# Patient Record
Sex: FEMALE | Race: BLACK OR AFRICAN AMERICAN | NOT HISPANIC OR LATINO
[De-identification: names, ages, dates, MRNs, and addresses within clinical notes are randomized per-mention and may not be internally consistent; named-entity substitution may affect disease eponyms.]

---

## 2019-05-20 ENCOUNTER — TRANSCRIPTION ENCOUNTER (OUTPATIENT)
Age: 44
End: 2019-05-20

## 2019-05-20 ENCOUNTER — EMERGENCY (EMERGENCY)
Facility: HOSPITAL | Age: 44
LOS: 1 days | Discharge: HOME | End: 2019-05-20
Attending: EMERGENCY MEDICINE | Admitting: EMERGENCY MEDICINE
Payer: COMMERCIAL

## 2019-05-20 ENCOUNTER — INPATIENT (INPATIENT)
Facility: HOSPITAL | Age: 44
LOS: 0 days | Discharge: HOME | DRG: 337 | End: 2019-05-21
Attending: SURGERY | Admitting: SURGERY
Payer: COMMERCIAL

## 2019-05-20 VITALS
RESPIRATION RATE: 16 BRPM | TEMPERATURE: 98 F | OXYGEN SATURATION: 100 % | SYSTOLIC BLOOD PRESSURE: 127 MMHG | HEART RATE: 88 BPM | DIASTOLIC BLOOD PRESSURE: 83 MMHG

## 2019-05-20 VITALS
DIASTOLIC BLOOD PRESSURE: 80 MMHG | RESPIRATION RATE: 18 BRPM | OXYGEN SATURATION: 98 % | HEART RATE: 100 BPM | TEMPERATURE: 97 F | SYSTOLIC BLOOD PRESSURE: 122 MMHG

## 2019-05-20 VITALS — WEIGHT: 175.05 LBS | HEIGHT: 59 IN

## 2019-05-20 DIAGNOSIS — E66.01 MORBID (SEVERE) OBESITY DUE TO EXCESS CALORIES: Chronic | ICD-10-CM

## 2019-05-20 DIAGNOSIS — K56.609 UNSPECIFIED INTESTINAL OBSTRUCTION, UNSPECIFIED AS TO PARTIAL VERSUS COMPLETE OBSTRUCTION: ICD-10-CM

## 2019-05-20 LAB
APPEARANCE UR: SIGNIFICANT CHANGE UP
BACTERIA # UR AUTO: NEGATIVE — SIGNIFICANT CHANGE UP
BILIRUB UR-MCNC: NEGATIVE — SIGNIFICANT CHANGE UP
BLOOD UR QL VISUAL: NEGATIVE — SIGNIFICANT CHANGE UP
COLOR SPEC: YELLOW — SIGNIFICANT CHANGE UP
GLUCOSE UR-MCNC: NEGATIVE — SIGNIFICANT CHANGE UP
HYALINE CASTS # UR AUTO: SIGNIFICANT CHANGE UP
KETONES UR-MCNC: HIGH
LEUKOCYTE ESTERASE UR-ACNC: SIGNIFICANT CHANGE UP
NITRITE UR-MCNC: NEGATIVE — SIGNIFICANT CHANGE UP
PH UR: 8 — SIGNIFICANT CHANGE UP (ref 5–8)
PROT UR-MCNC: 70 — SIGNIFICANT CHANGE UP
RBC CASTS # UR COMP ASSIST: SIGNIFICANT CHANGE UP (ref 0–?)
SP GR SPEC: 1.03 — SIGNIFICANT CHANGE UP (ref 1–1.04)
SQUAMOUS # UR AUTO: SIGNIFICANT CHANGE UP
UROBILINOGEN FLD QL: SIGNIFICANT CHANGE UP
WBC UR QL: HIGH (ref 0–?)

## 2019-05-20 PROCEDURE — 74177 CT ABD & PELVIS W/CONTRAST: CPT | Mod: 26

## 2019-05-20 PROCEDURE — 99284 EMERGENCY DEPT VISIT MOD MDM: CPT

## 2019-05-20 RX ORDER — SODIUM CHLORIDE 9 MG/ML
1000 INJECTION, SOLUTION INTRAVENOUS ONCE
Refills: 0 | Status: COMPLETED | OUTPATIENT
Start: 2019-05-20 | End: 2019-05-20

## 2019-05-20 RX ORDER — SODIUM CHLORIDE 9 MG/ML
1000 INJECTION, SOLUTION INTRAVENOUS
Refills: 0 | Status: DISCONTINUED | OUTPATIENT
Start: 2019-05-20 | End: 2019-05-21

## 2019-05-20 RX ORDER — SODIUM CHLORIDE 9 MG/ML
1000 INJECTION INTRAMUSCULAR; INTRAVENOUS; SUBCUTANEOUS ONCE
Refills: 0 | Status: COMPLETED | OUTPATIENT
Start: 2019-05-20 | End: 2019-05-20

## 2019-05-20 RX ORDER — MORPHINE SULFATE 50 MG/1
4 CAPSULE, EXTENDED RELEASE ORAL ONCE
Refills: 0 | Status: DISCONTINUED | OUTPATIENT
Start: 2019-05-20 | End: 2019-05-20

## 2019-05-20 RX ORDER — ONDANSETRON 8 MG/1
4 TABLET, FILM COATED ORAL ONCE
Refills: 0 | Status: COMPLETED | OUTPATIENT
Start: 2019-05-20 | End: 2019-05-20

## 2019-05-20 RX ADMIN — SODIUM CHLORIDE 1000 MILLILITER(S): 9 INJECTION, SOLUTION INTRAVENOUS at 21:27

## 2019-05-20 RX ADMIN — MORPHINE SULFATE 4 MILLIGRAM(S): 50 CAPSULE, EXTENDED RELEASE ORAL at 17:43

## 2019-05-20 RX ADMIN — MORPHINE SULFATE 4 MILLIGRAM(S): 50 CAPSULE, EXTENDED RELEASE ORAL at 22:35

## 2019-05-20 RX ADMIN — ONDANSETRON 4 MILLIGRAM(S): 8 TABLET, FILM COATED ORAL at 17:42

## 2019-05-20 RX ADMIN — SODIUM CHLORIDE 1000 MILLILITER(S): 9 INJECTION INTRAMUSCULAR; INTRAVENOUS; SUBCUTANEOUS at 21:27

## 2019-05-20 RX ADMIN — SODIUM CHLORIDE 2000 MILLILITER(S): 9 INJECTION INTRAMUSCULAR; INTRAVENOUS; SUBCUTANEOUS at 18:49

## 2019-05-20 RX ADMIN — MORPHINE SULFATE 4 MILLIGRAM(S): 50 CAPSULE, EXTENDED RELEASE ORAL at 21:32

## 2019-05-20 NOTE — CONSULT NOTE ADULT - ASSESSMENT
44F s/p zaria en y gastric bypass 1/2019 at [facility] presented today with two days of abdominal pain with CT findings concerning for internal hernia    -NPO  -please start banana bag STAT  -plan to transfer to [facility], b[facility] of excellence, for surgical intervention  under Dr. Adam Cruz, PGY-3

## 2019-05-20 NOTE — ED PROVIDER NOTE - OBJECTIVE STATEMENT
45 yo F h/o asthma, GERD, obesity, s/p gastric bypass (1/2019) at [facility], who reports diffuse abd pain, worse over the epigastrium since yesterday evening. Pain associated w/ nausea and a few episodes of non blood non bilious emesis which started after 11am shortly after drinking nutrient shake. Denies fevers or chills. Denies chest pain, back pain, flank pain, or urinary complaints.    Last BM - yesterday, non bloody; ; has not been passing gas;  Last PO - 11 am today (nutrient shake)

## 2019-05-20 NOTE — ED PROVIDER NOTE - NSRISKOFTRANSFER_ED_A_ED
Transportation Risk (There is always a risk of traffic delays resulting in deterioration of condition.)/Deterioration of Condition En Route/Death or Disability/Increased Pain

## 2019-05-20 NOTE — CONSULT NOTE ADULT - SUBJECTIVE AND OBJECTIVE BOX
GENERAL SURGERY CONSULT NOTE    44F s/p zaria en y gastric bypass 2019 at [facility] presented today with two days of abdominal pain. As per patient, pain developed yesterday but was not severe and was intermittent. Pain became severe today and was associated with several episodes of dark emesis. Pain worse in bilateral upper quadrants, nothing makes the pain better or worse. Patient does not wish to return to her surgeon at [facility], because she was frightened of a superficial infection she had had from her JOHANNY site.   In the ED, patient with normal vital signs. WBC 10.6, lactate 3.0. Significant tenderness on abdominal exam, sites well healed, CT scan showing concern for internal hernia causing SBO.   Patient with history of diabetes and gastric sleeve in the past with failure to lose goal weight.     PAST MEDICAL & SURGICAL HISTORY:  Mild intermittent asthma without complication: last rescue inhaler use 4 months ago  Hiatal hernia  Morbid obesity  Sleep apnea, obstructive: CPAP  - not using  GERD (gastroesophageal reflux disease)  S/P  section  Breast hypertrophy: s/p breast reduction  Obesity, morbid: s/p Lap band  2009    FAMILY HISTORY:  No pertinent family history in first degree relatives    SOCIAL HISTORY:    MEDICATIONS  (STANDING):  multivitamin/thiamine/folic acid in sodium chloride 0.9% 1000 milliLiter(s) (1000 mL/Hr) IV Continuous <Continuous>    MEDICATIONS  (PRN):    Allergies    doxycycline (Hives; Anaphylaxis)  food allergy (Unknown)    Intolerances    PHYSICAL EXAM    Vital Signs Last 24 Hrs  T(C): 36.8 (20 May 2019 21:32), Max: 37 (20 May 2019 17:06)  T(F): 98.2 (20 May 2019 21:32), Max: 98.6 (20 May 2019 17:06)  HR: 88 (20 May 2019 21:32) (70 - 100)  BP: 127/83 (20 May 2019 21:32) (115/57 - 127/83)  BP(mean): --  RR: 16 (20 May 2019 21:32) (16 - 18)  SpO2: 100% (20 May 2019 21:32) (98% - 100%)  Daily     Daily     General: WN/WD NAD  Neurology: A&Ox3, nonfocal, ALLEN x 4  Head:  Normocephalic, atraumatic  ENT:  Mucosa moist, no ulcerations  Neck:  Supple, no sinuses or palpable masses  Lymphatic:  No palpable cervical, supraclavicular, axillary or inguinal adenopathy  Respiratory: CTA B/L  CV: RRR, S1S2, no murmur  Abdominal: Softly distended, tenderness to palpation in bilateral upper quadrants, incisions well healed   MSK: No edema, + peripheral pulses, FROM all 4 extremity                            13.8   10.51 )-----------( 210      ( 20 May 2019 17:30 )             42.5     05-20    137  |  98  |  15  ----------------------------<  113<H>  5.5<H>   |  22  |  0.68    Ca    10.4      20 May 2019 17:30    TPro  8.4<H>  /  Alb  4.5  /  TBili  1.0  /  DBili  x   /  AST  53<H>  /  ALT  19  /  AlkPhos  56  05-20      Urinalysis Basic - ( 20 May 2019 17:54 )    Color: YELLOW / Appearance: Lt TURBID / S.027 / pH: 8.0  Gluc: NEGATIVE / Ketone: MODERATE  / Bili: NEGATIVE / Urobili: TRACE   Blood: NEGATIVE / Protein: 70 / Nitrite: NEGATIVE   Leuk Esterase: TRACE / RBC: 0-2 / WBC 6-10   Sq Epi: MANY / Non Sq Epi: x / Bacteria: NEGATIVE        IMAGING STUDIES:

## 2019-05-20 NOTE — PRE-ANESTHESIA EVALUATION ADULT - NSANTHPMHFT_GEN_ALL_CORE
Denies cardiac disease, asthma is allergy/weather related. Last inhaler use 4 mos ago. JSOE, noncompliant with CPAP. DM2, diet controlled, HbA1C 6 per patient. 3 episodes n/v today, last a few hrs ago, given zofran. Patient had lap band 2008, gastric sleeve 2015. Gastric bypass 1/2019, lost 60lbs since recent surgery

## 2019-05-20 NOTE — ED PROVIDER NOTE - ATTENDING CONTRIBUTION TO CARE
Dr. Mahmood:  I have personally performed a face to face bedside history and physical examination of this patient. I have discussed the history, examination, review of systems, assessment and plan of management with the resident. I have reviewed the electronic medical record and amended it to reflect my history, review of systems, physical exam, assessment and plan.    44F h/o asthma, obesity, s/p gastric bypass Jan 2019 at [facility], presents with diffuse abdominal pain and N/V since last night.  Last BM yesterday.  Denies fever/chills, cp, sob, diarrhea, urinary symptoms.    Exam:  - moaning in pain  - rrr  - ctab  - abd soft, diffusely TTP    A/P  - consider SBO vs other intra-abd pathology  - cbc, cmp, vbg, ua, urine culture  - CT abd/pelvis

## 2019-05-20 NOTE — ED PROVIDER NOTE - PROGRESS NOTE DETAILS
Elizabeth Goldberger PGY-2: pt signed out to me pending ctap. Called from radiology that is concern for small bowel volvulus vs internal hernia. Paged surg for cs, though may have to be xfer given hx bariatric surg

## 2019-05-20 NOTE — ED PROVIDER NOTE - PMH
GERD (gastroesophageal reflux disease)    Hiatal hernia    Mild intermittent asthma without complication  last rescue inhaler use 4 months ago  Morbid obesity    Sleep apnea, obstructive  CPAP  - not using

## 2019-05-20 NOTE — ED ADULT NURSE NOTE - OBJECTIVE STATEMENT
Received rpt from Day shift RN. Pt arrived w/ c/o abdominal pain that started yesterday. Pt medicated for pain as ordered. Will continue to monitor.

## 2019-05-21 ENCOUNTER — TRANSCRIPTION ENCOUNTER (OUTPATIENT)
Age: 44
End: 2019-05-21

## 2019-05-21 VITALS
TEMPERATURE: 99 F | HEART RATE: 62 BPM | RESPIRATION RATE: 18 BRPM | OXYGEN SATURATION: 100 % | SYSTOLIC BLOOD PRESSURE: 107 MMHG | DIASTOLIC BLOOD PRESSURE: 69 MMHG

## 2019-05-21 RX ORDER — HYDROMORPHONE HYDROCHLORIDE 2 MG/ML
0.5 INJECTION INTRAMUSCULAR; INTRAVENOUS; SUBCUTANEOUS
Refills: 0 | Status: DISCONTINUED | OUTPATIENT
Start: 2019-05-21 | End: 2019-05-21

## 2019-05-21 RX ORDER — ACETAMINOPHEN 500 MG
2 TABLET ORAL
Qty: 0 | Refills: 0 | DISCHARGE
Start: 2019-05-21

## 2019-05-21 RX ORDER — ONDANSETRON 8 MG/1
4 TABLET, FILM COATED ORAL EVERY 6 HOURS
Refills: 0 | Status: DISCONTINUED | OUTPATIENT
Start: 2019-05-21 | End: 2019-05-21

## 2019-05-21 RX ORDER — ACETAMINOPHEN 500 MG
1000 TABLET ORAL EVERY 6 HOURS
Refills: 0 | Status: DISCONTINUED | OUTPATIENT
Start: 2019-05-21 | End: 2019-05-21

## 2019-05-21 RX ORDER — SODIUM CHLORIDE 9 MG/ML
1000 INJECTION, SOLUTION INTRAVENOUS
Refills: 0 | Status: DISCONTINUED | OUTPATIENT
Start: 2019-05-21 | End: 2019-05-21

## 2019-05-21 RX ORDER — OXYCODONE HYDROCHLORIDE 5 MG/1
5 TABLET ORAL EVERY 6 HOURS
Refills: 0 | Status: DISCONTINUED | OUTPATIENT
Start: 2019-05-21 | End: 2019-05-21

## 2019-05-21 RX ORDER — KETOROLAC TROMETHAMINE 30 MG/ML
15 SYRINGE (ML) INJECTION EVERY 6 HOURS
Refills: 0 | Status: DISCONTINUED | OUTPATIENT
Start: 2019-05-21 | End: 2019-05-21

## 2019-05-21 RX ORDER — ENOXAPARIN SODIUM 100 MG/ML
40 INJECTION SUBCUTANEOUS EVERY 24 HOURS
Refills: 0 | Status: DISCONTINUED | OUTPATIENT
Start: 2019-05-21 | End: 2019-05-21

## 2019-05-21 RX ORDER — OXYCODONE HYDROCHLORIDE 5 MG/1
1 TABLET ORAL
Qty: 12 | Refills: 0
Start: 2019-05-21 | End: 2019-05-23

## 2019-05-21 RX ADMIN — Medication 1000 MILLIGRAM(S): at 05:50

## 2019-05-21 RX ADMIN — Medication 1000 MILLIGRAM(S): at 05:19

## 2019-05-21 RX ADMIN — Medication 15 MILLIGRAM(S): at 13:38

## 2019-05-21 RX ADMIN — SODIUM CHLORIDE 75 MILLILITER(S): 9 INJECTION, SOLUTION INTRAVENOUS at 02:56

## 2019-05-21 RX ADMIN — ENOXAPARIN SODIUM 40 MILLIGRAM(S): 100 INJECTION SUBCUTANEOUS at 06:30

## 2019-05-21 RX ADMIN — HYDROMORPHONE HYDROCHLORIDE 0.5 MILLIGRAM(S): 2 INJECTION INTRAMUSCULAR; INTRAVENOUS; SUBCUTANEOUS at 03:50

## 2019-05-21 RX ADMIN — Medication 1000 MILLIGRAM(S): at 13:38

## 2019-05-21 RX ADMIN — HYDROMORPHONE HYDROCHLORIDE 0.5 MILLIGRAM(S): 2 INJECTION INTRAMUSCULAR; INTRAVENOUS; SUBCUTANEOUS at 04:00

## 2019-05-21 RX ADMIN — Medication 15 MILLIGRAM(S): at 13:06

## 2019-05-21 RX ADMIN — Medication 15 MILLIGRAM(S): at 05:19

## 2019-05-21 RX ADMIN — Medication 15 MILLIGRAM(S): at 05:50

## 2019-05-21 RX ADMIN — Medication 1000 MILLIGRAM(S): at 13:06

## 2019-05-21 NOTE — H&P ADULT - NSHPPHYSICALEXAM_GEN_ALL_CORE
Vital Signs Last 24 Hrs  T(C): 36.8 (20 May 2019 21:32), Max: 37 (20 May 2019 17:06)  T(F): 98.2 (20 May 2019 21:32), Max: 98.6 (20 May 2019 17:06)  HR: 88 (20 May 2019 21:32) (70 - 100)  BP: 127/83 (20 May 2019 21:32) (115/57 - 127/83)  BP(mean): --  RR: 16 (20 May 2019 21:32) (16 - 18)  SpO2: 100% (20 May 2019 21:32) (98% - 100%)  Daily     Daily     General: WN/WD NAD  Neurology: A&Ox3, nonfocal, ALLEN x 4  Head:  Normocephalic, atraumatic  ENT:  Mucosa moist, no ulcerations  Neck:  Supple, no sinuses or palpable masses  Lymphatic:  No palpable cervical, supraclavicular, axillary or inguinal adenopathy  Respiratory: CTA B/L  CV: RRR, S1S2, no murmur  Abdominal: Softly distended, tenderness to palpation in bilateral upper quadrants, incisions well healed   MSK: No edema, + peripheral pulses, FROM all 4 extremity

## 2019-05-21 NOTE — H&P ADULT - ASSESSMENT
44F s/p zaria en y gastric bypass 1/2019 at [facility] presented today with two days of abdominal pain with CT findings concerning for internal hernia    - OR with Dr. Robb

## 2019-05-21 NOTE — BRIEF OPERATIVE NOTE - NSICDXBRIEFPROCEDURE_GEN_ALL_CORE_FT
PROCEDURES:  Laparoscopic enterolysis 21-May-2019 01:39:30  Raul Kinsey  Reduction, hernia, internal, laparoscopic 21-May-2019 01:39:22  Raul Kinsey

## 2019-05-21 NOTE — BRIEF OPERATIVE NOTE - NSICDXBRIEFPREOP_GEN_ALL_CORE_FT
PRE-OP DIAGNOSIS:  Small bowel obstruction 21-May-2019 01:39:47  Raul Kinsey  Internal hernia 21-May-2019 01:39:39  Raul Kinsey

## 2019-05-21 NOTE — H&P ADULT - NSICDXPASTSURGICALHX_GEN_ALL_CORE_FT
PAST SURGICAL HISTORY:  Breast hypertrophy s/p breast reduction    Obesity, morbid s/p Lap band  2009    S/P  section

## 2019-05-21 NOTE — DISCHARGE NOTE NURSING/CASE MANAGEMENT/SOCIAL WORK - NSDCDPATPORTLINK_GEN_ALL_CORE
You can access the [facility] Patient Portal, offered by [facility], by registering with the following website: [email or web address] [facility]

## 2019-05-21 NOTE — DISCHARGE NOTE PROVIDER - NSDCFUADDINST_GEN_ALL_CORE_FT
WOUND CARE:  Please keep incisions clean and dry. Please do not Scrub or rub incisions. Do not use lotion or powder on incisions.   BATHING: Please do not submerge wound underwater. You may shower and/or sponge bathe.  ACTIVITY: No heavy lifting or straining. Otherwise, you may return to your usual level of physical activity. If you are taking narcotic pain medication (such as Percocet) DO NOT drive a car, operate machinery or make important decisions.  DIET: Return to your usual diet.  NOTIFY YOUR SURGEON IF: You have any bleeding that does not stop, any pus draining from your wound(s), any fever (over 100.4 F) or chills, persistent nausea/vomiting, persistent diarrhea, or if your pain is not controlled on your discharge pain medications.  FOLLOW-UP:  Please follow-up with your surgeon, within 7 days following discharge- please call to schedule an appointment.

## 2019-05-21 NOTE — DISCHARGE NOTE PROVIDER - CARE PROVIDER_API CALL
Adam Robb)  Surgery  [address], [address]  [place] [address]  Phone: [phone]  Fax: [phone]  Follow Up Time: 1 week

## 2019-05-21 NOTE — PROGRESS NOTE ADULT - SUBJECTIVE AND OBJECTIVE BOX
SURGERY Progress Note  JAILYN KEMAL    S: Patient seen at bedside, resting comfortably.  She is doing well postoperatively, on CLD, no nausea/vomiting.    Pain controlled.      O:  T(C): 36.4 (05-21-19 @ 05:00), Max: 37 (05-20-19 @ 17:06)  HR: 66 (05-21-19 @ 06:00) (57 - 100)  BP: 106/66 (05-21-19 @ 06:00) (106/63 - 127/83)  RR: 15 (05-21-19 @ 06:00) (15 - 18)  SpO2: 100% (05-21-19 @ 06:00) (98% - 100%)      Physical Exam:  Gen: NAD  Neuro: Follows commands  Resp: No acute respiratory distress, normal effort  CV: Normal rate, regular rhythm  Abd: Soft, NT, ND  Incision: Clean, no evidence of hematoma or active bleeding    Lines:    Labs:  CBC (05-20 @ 17:30)                              13.8                           10.51<H>  )----------------(  210        86.7<H>% Neutrophils, 9.1<L>% Lymphocytes, ANC: 9.11<H>                              42.5                  BMP (05-20 @ 17:30)             137     |  98      |  15    		Ca++ --      Ca 10.4               ---------------------------------( 113<H>		Mg --                 5.5<H>  |  22      |  0.68  			Ph --        LFTs (05-20 @ 17:30)      TPro 8.4<H> / Alb 4.5 / TBili 1.0 / DBili -- / AST 53<H> / ALT 19 / AlkPhos 56      ABG (05-20 @ 17:30)      /  /  /  /  / %     Lactate:   3.0<H>    VBG (05-20 @ 17:30)     7.39 / 49 / 31<L> / 27 / 4.5 / 49.9<L>%        44F s/p zaria en y gastric bypass 1/2019 at [facility] presented today with two days of abdominal pain with CT findings concerning for internal hernia.  Underwent laparoscopic Gunjan.     - CLD/IVF (bariatric)   - Pain control  - OOB as tolerated  - DVT ppx  - Monitor GI/ fxn  - Dispo: Floor

## 2019-05-21 NOTE — PROGRESS NOTE ADULT - ATTENDING COMMENTS
Patient seen/examined.  Agree w above note and plan and have discussed plan w house staff.  Looks and feels great, tolerating liqs.  Home later if regular diet tolerated    Adam Robb MD

## 2019-05-21 NOTE — H&P ADULT - NSHPLABSRESULTS_GEN_ALL_CORE
13.8   10.51 )-----------( 210      ( 20 May 2019 17:30 )             42.5     05-20    137  |  98  |  15  ----------------------------<  113<H>  5.5<H>   |  22  |  0.68    Ca    10.4      20 May 2019 17:30    TPro  8.4<H>  /  Alb  4.5  /  TBili  1.0  /  DBili  x   /  AST  53<H>  /  ALT  19  /  AlkPhos  56  05-20    Urinalysis Basic - ( 20 May 2019 17:54 )  Color: YELLOW / Appearance: Lt TURBID / S.027 / pH: 8.0  Gluc: NEGATIVE / Ketone: MODERATE  / Bili: NEGATIVE / Urobili: TRACE   Blood: NEGATIVE / Protein: 70 / Nitrite: NEGATIVE   Leuk Esterase: TRACE / RBC: 0-2 / WBC 6-10   Sq Epi: MANY / Non Sq Epi: x / Bacteria: NEGATIVE    EXAM:  CT ABDOMEN AND PELVIS OC IC    PROCEDURE DATE:  May 20 2019     FINDINGS:  LOWER CHEST: A few small left lower lobe thin-walled cysts.  LIVER: Within normal limits.  BILE DUCTS: Normal caliber.  GALLBLADDER: Within normal limits.  SPLEEN: Within normal limits.  PANCREAS: Within normal limits.  ADRENALS: Within normal limits.  KIDNEYS/URETERS: No hydronephrosis. Symmetric enhancement.  BLADDER: Within normal limits.  REPRODUCTIVE ORGANS: IUD is in appropriate position. A 2.8 cm right   ovarian cyst.  BOWEL: Status post gastric bypass surgery with gastrojejunostomy (likely   antecolic). There is swirling of the mesentery, segment of the jejunums   and ileum (likely terminal ileum with dilated loops of small bowel and   angulation (2:56, 602:41).   Segments of the jejunum are dilated measuring greater than 3.5 cm.  No free air. No bowel pneumatosis or portal venous gas. Appendix is   normal. The large bowel is unremarkable.  PERITONEUM: No ascites. No pneumoperitoneum.  VESSELS:  The aorta is normal in caliber. The major branches of the aorta  are proximally patent. Portal vein and hepatic veins are patent.  RETROPERITONEUM: No lymphadenopathy.    ABDOMINAL WALL: Tiny fat-containing umbilical hernia.  BONES: Degenerative changes.    IMPRESSION:   1.  The findings are suspicious for an internal hernia/small bowel   volvulus with bowel obstruction.   2.  No evidence of bowel pneumatosis, portal venous gas or   pneumoperitoneum.

## 2019-05-21 NOTE — BRIEF OPERATIVE NOTE - OPERATION/FINDINGS
multiple adhesions around jejunojejunostomy were lysed likely causing the internal hernia    all mesenteric defects were sealed (JJ and Wharton space)    bowel all pink and viable, some chyle-stained mesentery found

## 2019-05-21 NOTE — H&P ADULT - NSICDXPASTMEDICALHX_GEN_ALL_CORE_FT
PAST MEDICAL HISTORY:  GERD (gastroesophageal reflux disease)     Hiatal hernia     Mild intermittent asthma without complication last rescue inhaler use 4 months ago    Morbid obesity     Sleep apnea, obstructive CPAP  - not using

## 2019-05-21 NOTE — H&P ADULT - HISTORY OF PRESENT ILLNESS
44F s/p zaria en y gastric bypass 1/2019 at [facility] presented today with two days of abdominal pain. As per patient, pain developed yesterday but was not severe and was intermittent. Pain became severe today and was associated with several episodes of dark emesis. Pain worse in bilateral upper quadrants, nothing makes the pain better or worse. Patient does not wish to return to her surgeon at [facility], because she was frightened of a superficial infection she had had from her JOHANNY site.   In the ED, patient with normal vital signs. WBC 10.6, lactate 3.0. Significant tenderness on abdominal exam, sites well healed, CT scan showing concern for internal hernia causing SBO.   Patient with history of diabetes and gastric sleeve in the past with failure to lose goal weight.

## 2019-05-21 NOTE — DISCHARGE NOTE PROVIDER - HOSPITAL COURSE
44F s/p zaria en y gastric bypass 1/2019 at [facility] presented today with two days of abdominal pain. As per patient, pain developed yesterday but was not severe and was intermittent. Pain became severe today and was associated with several episodes of dark emesis. Pain worse in bilateral upper quadrants, nothing makes the pain better or worse. Patient does not wish to return to her surgeon at [facility], because she was frightened of a superficial infection she had had from her JOHANNY site.     In the ED, patient with normal vital signs. WBC 10.6, lactate 3.0. Significant tenderness on abdominal exam, sites well healed, CT scan showing concern for internal hernia causing SBO.     Patient with history of diabetes and gastric sleeve in the past with failure to lose goal weight.        The patient was taken to the operating room and underwent a lysis of adhesions and repair of mesenteric defects. The patient tolerated the procedure well and was extubated and taken to the PACU. Postoperatively the patient had good pain control and was started on a bariatric clears diet. The patient continued to progress throughout the day and was tolerating a diet, ambulating, and voiding. The patient was discharged in stable condition and was comfortable with discharge.

## 2019-05-21 NOTE — BRIEF OPERATIVE NOTE - NSICDXBRIEFPOSTOP_GEN_ALL_CORE_FT
POST-OP DIAGNOSIS:  SBO (small bowel obstruction) 21-May-2019 01:40:02  Raul Kinsey  Internal hernia 21-May-2019 01:39:55  Raul Kinsey

## 2019-05-22 LAB
BACTERIA UR CULT: SIGNIFICANT CHANGE UP
SPECIMEN SOURCE: SIGNIFICANT CHANGE UP

## 2019-06-13 ENCOUNTER — APPOINTMENT (OUTPATIENT)
Dept: SURGERY | Facility: CLINIC | Age: 44
End: 2019-06-13
Payer: COMMERCIAL

## 2019-06-13 VITALS
TEMPERATURE: 98.7 F | HEART RATE: 69 BPM | DIASTOLIC BLOOD PRESSURE: 78 MMHG | RESPIRATION RATE: 16 BRPM | SYSTOLIC BLOOD PRESSURE: 120 MMHG | OXYGEN SATURATION: 100 %

## 2019-06-13 DIAGNOSIS — K45.8 OTHER SPECIFIED ABDOMINAL HERNIA W/OUT OBSTRUCTION OR GANGRENE: ICD-10-CM

## 2019-06-13 DIAGNOSIS — Z86.39 PERSONAL HISTORY OF OTHER ENDOCRINE, NUTRITIONAL AND METABOLIC DISEASE: ICD-10-CM

## 2019-06-13 DIAGNOSIS — Z80.3 FAMILY HISTORY OF MALIGNANT NEOPLASM OF BREAST: ICD-10-CM

## 2019-06-13 PROCEDURE — 99024 POSTOP FOLLOW-UP VISIT: CPT

## 2019-06-13 NOTE — HISTORY OF PRESENT ILLNESS
[de-identified] : This patient had a gastric bypass many years ago. She presented to [place] emergency room with an internal hernia requiring emergency surgery.Today she is tolerating a regular diet and having no abdominal pain. She states she's been treated for constipation by her gastroenterologist. She was started on lactulose yesterday

## 2019-06-13 NOTE — REASON FOR VISIT
[Post Op: _________] : a [unfilled] post op visit [FreeTextEntry1] :  Laparoscopic lysis of adhesions, Reduction of internal hernia and Ugalde catheter placement.

## 2020-02-18 ENCOUNTER — TRANSCRIPTION ENCOUNTER (OUTPATIENT)
Age: 45
End: 2020-02-18

## 2020-02-18 ENCOUNTER — APPOINTMENT (OUTPATIENT)
Dept: BARIATRICS/WEIGHT MGMT | Facility: CLINIC | Age: 45
End: 2020-02-18
Payer: COMMERCIAL

## 2020-02-18 VITALS
WEIGHT: 200.4 LBS | OXYGEN SATURATION: 98 % | DIASTOLIC BLOOD PRESSURE: 70 MMHG | HEIGHT: 59 IN | BODY MASS INDEX: 40.4 KG/M2 | SYSTOLIC BLOOD PRESSURE: 100 MMHG | HEART RATE: 88 BPM

## 2020-02-18 DIAGNOSIS — M79.89 OTHER SPECIFIED SOFT TISSUE DISORDERS: ICD-10-CM

## 2020-02-18 DIAGNOSIS — Z96.0 PRESENCE OF UROGENITAL IMPLANTS: ICD-10-CM

## 2020-02-18 DIAGNOSIS — Z99.89 OBSTRUCTIVE SLEEP APNEA (ADULT) (PEDIATRIC): ICD-10-CM

## 2020-02-18 DIAGNOSIS — Z98.84 BARIATRIC SURGERY STATUS: ICD-10-CM

## 2020-02-18 DIAGNOSIS — E66.01 MORBID (SEVERE) OBESITY DUE TO EXCESS CALORIES: ICD-10-CM

## 2020-02-18 DIAGNOSIS — K21.9 GASTRO-ESOPHAGEAL REFLUX DISEASE W/OUT ESOPHAGITIS: ICD-10-CM

## 2020-02-18 DIAGNOSIS — G47.33 OBSTRUCTIVE SLEEP APNEA (ADULT) (PEDIATRIC): ICD-10-CM

## 2020-02-18 DIAGNOSIS — Z09 ENCOUNTER FOR FOLLOW-UP EXAMINATION AFTER COMPLETED TREATMENT FOR CONDITIONS OTHER THAN MALIGNANT NEOPLASM: ICD-10-CM

## 2020-02-18 PROCEDURE — 99205 OFFICE O/P NEW HI 60 MIN: CPT

## 2020-02-18 RX ORDER — MULTIVIT-MIN/IRON/FOLIC ACID/K 18-600-40
CAPSULE ORAL
Refills: 0 | Status: DISCONTINUED | COMMUNITY
End: 2020-02-18

## 2020-02-18 NOTE — HISTORY OF PRESENT ILLNESS
[FreeTextEntry1] : Ms. JAILYN SOMMER is a 45 year year old female who presents for evaluation and treatment of Class 3 obesity. \par \par Obesity related co-morbidities: GERD, JOSE\par \par Patient lives - patient, , 2 daughters - 14,20. 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  another job - 11pm to 7am.  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  on top of that classes - she has online questions.  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 \par Weight History:\par Lowest adult weight: 135 in 20s. \par Highest adult weight: 254 before gastric bypass\par \par Has lost 50 lbs after gastric bypass Jan 2019- July 2019 and gained that back over the past year.\par \par Obesity began post pregnancy - late 20s .  Weight gain has occurred with: 2 pregnancies\par \par Past weight loss attempts include: Weight Watchers  These have produced a maximum of 50- 80 pounds of weight loss.  \par Anti-obesity medications in the past: phen-fen, over the counter medications.  "I'm looking for a quick fix"\par \par Reasons for desiring weight loss: \par Perceived obstacles to losing weight:  not tracking portions, eating more carbs, not strict, started school - Sept. weekly weigh ins.   Ends Oct 2021.  \par \par Sleep: 1-3 hours. Does not get regular sleep.  Her "sleep days" are Wed and Thurs.\par \par Has a daily meal at work between 8-10pm - salad from cafeteria, or soup that she made. \par \par Diet history:\par wakes up at: does not have routine time she wakes up\par B: egg white omelette with vegetable.  3 out of 7 days (when she's back from night shift)\par L: no regular lunch\par D: 8-10pm dinner at work. \par \par trenta at [facility]. "I'm totally addicted."  Latte coffee - sometimes 3 per day "instead of eating"\par \par snacks: chips, pretzels, sweet drinks, juice\par eating after dinner: no scheduled eating habits\par overeating episodes: yes "I have to pace myself because I get nauseated with liquids"\par Goes frequently to either [employer] or [employer]\par \par Sodas/fast food/processed foods: +processed foods, coffee. \par +abdominal pain after eating only "2 spoons of rice"\par \par Water intake per day: "none. "\par \par Physical activity: walks a lot at work, none outside of work\par Patient enjoys: walking, treadmill.  every day 10-20k steps. \par +has a gym membership\par Current physical activity:walking 2 hours every day at work\par \par Habits patient would like to change: decrease coffee, and park at a specific time (being late for work makes her not have enough time to get coffee). \par Level of interest in losing weight: 5/5\par Community support: 3/5.\par \par Factors that have helped in the past with losing weight and keeping it off: meal prep, no coffee\par \par 2015 - she was in the diabetes range.  A1c 8-9%.  Now it's in the 5's. Do not have these labs for review.  Only labs from hospital admission in July 2019 - she had surgery for an obstruction. \par Anemia - she was told she has anemia. She is not taking her bariatric vitamins - she has them at home. \par \par She has constipation at times and uses lactulose only PRN.  She had obstruction in July and had to have surgery. \par JOSE - noncompliant with CPAP.  \par \par GERD - has some acid reflux, getting EGD soon.

## 2020-02-18 NOTE — ASSESSMENT
[FreeTextEntry1] : BARIATRIC SURGERY HISTORY: lap band 2007, sleeve 2015, bypass 2019\par OBESITY CO-MORBIDITIES: JOSE\par ANTI-OBESITY MEDICATIONS: none\par OBESITY MEDICATION SIDE EFFECTS: none\par \par 45 y.o female with BMI 40 with JOSE, poor eating habits, irregular sleep schedule, anemia, lower leg swelling here for weight management\par \par Obesity\par - discussed in length about the need to have some structure to her day, and most importantly cut out liquid carbs.  Her diet is very poor and inadequate and is placing her at high risk for continuing weight gain.  \par - she would like to get into the habit of drinking water daily but is hesitant to put any minimum on it.  She agreed that she will get into the habit of drinking water in the car instead of buying juice or other drink.\par \par JOSE - supposed to be on CPAP, noncompliant.  \par - discussed role of JOSE on weight, stress, and hormones. \par - restart CPAP\par \par We discussed medications are not appropriate at this time as patient is struggling with basic lifestyle changes and bariatric diet.  Will reconsider if patient discontinues high calorie drinks and starts to meal prep. \par \par f/u 4 weeks\par \par

## 2020-02-18 NOTE — PHYSICAL EXAM
[Obese, well nourished, in no acute distress] : obese, well nourished, in no acute distress [Normal] : RRR, normal S1S2, no murmurs, rubs, gallops [de-identified] : +b/l lower extremity swelling 2+

## 2020-02-18 NOTE — REASON FOR VISIT
[Initial Consultation] : an initial consultation for [Obesity] : obesity [Obstructive Sleep Apena] : obstructive sleep apena [FreeTextEntry2] : GERD

## 2020-03-16 ENCOUNTER — TRANSCRIPTION ENCOUNTER (OUTPATIENT)
Age: 45
End: 2020-03-16

## 2020-03-17 ENCOUNTER — APPOINTMENT (OUTPATIENT)
Dept: BARIATRICS/WEIGHT MGMT | Facility: CLINIC | Age: 45
End: 2020-03-17

## 2021-06-02 NOTE — PACU DISCHARGE NOTE - NS MD DISCHARGE NOTE DISCHARGE
Consent for CARLTON signed by LETICIA espinosa Adonay during this shift, verbalizes understanding of the procedure.   Floor

## 2022-10-27 ENCOUNTER — APPOINTMENT (OUTPATIENT)
Dept: PULMONOLOGY | Facility: CLINIC | Age: 47
End: 2022-10-27

## 2022-11-23 ENCOUNTER — APPOINTMENT (OUTPATIENT)
Dept: PULMONOLOGY | Facility: CLINIC | Age: 47
End: 2022-11-23

## 2022-11-23 VITALS
DIASTOLIC BLOOD PRESSURE: 73 MMHG | SYSTOLIC BLOOD PRESSURE: 112 MMHG | HEART RATE: 69 BPM | WEIGHT: 220 LBS | BODY MASS INDEX: 44.43 KG/M2 | OXYGEN SATURATION: 98 %

## 2022-11-23 PROCEDURE — 99204 OFFICE O/P NEW MOD 45 MIN: CPT | Mod: 25

## 2022-11-23 PROCEDURE — 94010 BREATHING CAPACITY TEST: CPT

## 2022-11-24 NOTE — DISCUSSION/SUMMARY
[FreeTextEntry1] : Anabella is a patient with shortness of breath likely secondary to a combination of asthma and lung cysts.

## 2022-11-24 NOTE — ASSESSMENT
[FreeTextEntry1] : Continue Symbicort for history of asthma.\par Albuterol HFA as needed for shortness of breath\par Get noncontrast chest CT scan for follow-up on history of lung cysts.\par Return for pulmonary follow-up and complete pulmonary function test after chest CT scan has been performed.

## 2022-11-24 NOTE — PROCEDURE
[FreeTextEntry1] : Spirometry is within normal limits.\par \par CT Chest obtained at [facility] on 12/22/2021 which revealed:\par 1. Stable mid and lower lungs bilaterally thinly walled irregular shaped, cysts in the absence of smoking history and emphysema, REILLY versus alpha 1 antitrypsin are considerations.\par \par Outside CT Chest obtained on 03/10/2022 which revealed:\par 1. there are scattered well- marginated thin walled cysts centered in the lower lobes. There mainly occur along the bronchovacular bundles. A few of them are also elliptical in shape. 2018 also mentioned cysts in the lower lungs\par 2. Some very small/tiny pulmonary nodules are seen\par 3. No severo fibrosis\par 4 Imaging favored to reflect a process such as lymphocytic interstitial pneumonitis or John- Katherin Anthony. REILLY is less likely.

## 2022-11-24 NOTE — HISTORY OF PRESENT ILLNESS
[TextBox_4] : JAILYN SOMMER is a 47 year old female who presents for initial pulmonary evaluation\par Jailyn reports history of asthma\par Patient states she had CT chest which showed cysts in the upper and lower lobes\par Saw a doctor in [facility] and was suspicious for REILLY. Patient was evaluated at [facility], where REILLY was ruled out. She states she had a telehealth visit with [facility], where REILLY was again ruled out. \par Today, she complains of intermittent shortness of breath. The symptoms are worse with a busy day at work. She notes intermittent cough.\par Patient reports of sleep apnea. \par She reports taking Advair, albuterol, Symbicort, and Singulair.\par She notes having Covid earlier this year. \par Denies smoking history\par

## 2023-01-03 ENCOUNTER — APPOINTMENT (OUTPATIENT)
Dept: PULMONOLOGY | Facility: CLINIC | Age: 48
End: 2023-01-03

## 2023-04-25 ENCOUNTER — APPOINTMENT (OUTPATIENT)
Dept: PULMONOLOGY | Facility: CLINIC | Age: 48
End: 2023-04-25
Payer: COMMERCIAL

## 2023-04-25 VITALS — HEART RATE: 79 BPM | OXYGEN SATURATION: 97 % | DIASTOLIC BLOOD PRESSURE: 78 MMHG | SYSTOLIC BLOOD PRESSURE: 118 MMHG

## 2023-04-25 PROCEDURE — 94729 DIFFUSING CAPACITY: CPT

## 2023-04-25 PROCEDURE — 88738 HGB QUANT TRANSCUTANEOUS: CPT

## 2023-04-25 PROCEDURE — 99214 OFFICE O/P EST MOD 30 MIN: CPT | Mod: 25

## 2023-04-25 PROCEDURE — 94727 GAS DIL/WSHOT DETER LNG VOL: CPT

## 2023-04-25 PROCEDURE — 94010 BREATHING CAPACITY TEST: CPT

## 2023-04-25 PROCEDURE — ZZZZZ: CPT

## 2023-04-25 NOTE — HISTORY OF PRESENT ILLNESS
[TextBox_4] : JAILYN SOMMER is a 47 year old female who presents for initial pulmonary evaluation\par Jailyn reports history of asthma\par Patient states she had CT chest which showed cysts in the upper and lower lobes\par Saw a doctor in [facility] and was suspicious for REILLY. Patient was evaluated at [facility], where REILLY was ruled out. She states she had a telehealth visit with [facility], where REILLY was again ruled out. \par Today, she complains of intermittent shortness of breath. The symptoms are worse with a busy day at work. She notes intermittent cough.\par Patient reports of sleep apnea. \par She reports taking Advair, albuterol, Symbicort, and Singulair.\par She notes having Covid earlier this year. \par Denies smoking history\par

## 2023-04-25 NOTE — PROCEDURE
[FreeTextEntry1] : Pulmonary Function Test obtained in office today which revealed: Spirometry: Within normal limits, Lung Volume: Within normal limits, Diffusion: Within normal limits. \par ___\par \par  POCT - Hemoglobin (Hgb), quantitative, transcutaneous             Final\par \par No Documents Attached\par \par \par   Test   Result   Flag Reference Goal Last Verified \par   POCT - Hemoglobin (Hgb), quantitative, transcutaneous 10.4      REQUIRED \par \par  Ordered by: EFRAIN TORRES       Collected/Examined: [id number] 12:59PM       \par Verification Required       Stage: Final       \par  Performed at: In Office       Performed by: KEVIN FELIX       Resulted: [id number] 12:59PM       Last Updated: [id number] 12:59PM       \par

## 2023-04-25 NOTE — ASSESSMENT
[FreeTextEntry1] : Obtained and reviewed pulmonary function test results with patient today. \par Continue Symbicort for history of asthma.\par Albuterol HFA as needed for shortness of breath\par Get noncontrast chest CT scan for follow-up on history of lung cysts in 3 months.\par Return for pulmonary follow up in 3 months.

## 2023-04-30 LAB — POCT - HEMOGLOBIN (HGB), QUANTITATIVE, TRANSCUTANEOUS: 10.4

## 2023-05-02 ENCOUNTER — EMERGENCY (EMERGENCY)
Facility: HOSPITAL | Age: 48
LOS: 1 days | Discharge: HOME | End: 2023-05-02
Attending: EMERGENCY MEDICINE
Payer: COMMERCIAL

## 2023-05-02 VITALS
DIASTOLIC BLOOD PRESSURE: 88 MMHG | RESPIRATION RATE: 18 BRPM | OXYGEN SATURATION: 99 % | HEART RATE: 88 BPM | TEMPERATURE: 98 F | SYSTOLIC BLOOD PRESSURE: 132 MMHG

## 2023-05-02 VITALS
HEIGHT: 60 IN | OXYGEN SATURATION: 96 % | WEIGHT: 186.07 LBS | DIASTOLIC BLOOD PRESSURE: 83 MMHG | HEART RATE: 97 BPM | RESPIRATION RATE: 20 BRPM | SYSTOLIC BLOOD PRESSURE: 133 MMHG | TEMPERATURE: 99 F

## 2023-05-02 DIAGNOSIS — E66.01 MORBID (SEVERE) OBESITY DUE TO EXCESS CALORIES: Chronic | ICD-10-CM

## 2023-05-02 LAB
ALBUMIN SERPL ELPH-MCNC: 4.5 G/DL — SIGNIFICANT CHANGE UP (ref 3.3–5)
ALP SERPL-CCNC: 78 U/L — SIGNIFICANT CHANGE UP (ref 40–120)
ALT FLD-CCNC: 27 U/L — SIGNIFICANT CHANGE UP (ref 10–45)
ANION GAP SERPL CALC-SCNC: 15 MMOL/L — SIGNIFICANT CHANGE UP (ref 5–17)
ANISOCYTOSIS BLD QL: SLIGHT — SIGNIFICANT CHANGE UP
APPEARANCE UR: CLEAR — SIGNIFICANT CHANGE UP
APTT BLD: 27.2 SEC — LOW (ref 27.5–35.5)
AST SERPL-CCNC: 33 U/L — SIGNIFICANT CHANGE UP (ref 10–40)
BACTERIA # UR AUTO: NEGATIVE — SIGNIFICANT CHANGE UP
BASE EXCESS BLDV CALC-SCNC: -0.5 MMOL/L — SIGNIFICANT CHANGE UP (ref -2–3)
BASOPHILS # BLD AUTO: 0 K/UL — SIGNIFICANT CHANGE UP (ref 0–0.2)
BASOPHILS NFR BLD AUTO: 0 % — SIGNIFICANT CHANGE UP (ref 0–2)
BILIRUB SERPL-MCNC: 0.5 MG/DL — SIGNIFICANT CHANGE UP (ref 0.2–1.2)
BILIRUB UR-MCNC: NEGATIVE — SIGNIFICANT CHANGE UP
BLD GP AB SCN SERPL QL: NEGATIVE — SIGNIFICANT CHANGE UP
BUN SERPL-MCNC: 15 MG/DL — SIGNIFICANT CHANGE UP (ref 7–23)
CA-I SERPL-SCNC: 1.22 MMOL/L — SIGNIFICANT CHANGE UP (ref 1.15–1.33)
CALCIUM SERPL-MCNC: 9.9 MG/DL — SIGNIFICANT CHANGE UP (ref 8.4–10.5)
CHLORIDE BLDV-SCNC: 105 MMOL/L — SIGNIFICANT CHANGE UP (ref 96–108)
CHLORIDE SERPL-SCNC: 104 MMOL/L — SIGNIFICANT CHANGE UP (ref 96–108)
CO2 BLDV-SCNC: 27 MMOL/L — HIGH (ref 22–26)
CO2 SERPL-SCNC: 22 MMOL/L — SIGNIFICANT CHANGE UP (ref 22–31)
COLOR SPEC: SIGNIFICANT CHANGE UP
CREAT SERPL-MCNC: 0.75 MG/DL — SIGNIFICANT CHANGE UP (ref 0.5–1.3)
DIFF PNL FLD: NEGATIVE — SIGNIFICANT CHANGE UP
EGFR: 97 ML/MIN/1.73M2 — SIGNIFICANT CHANGE UP
EOSINOPHIL # BLD AUTO: 0.09 K/UL — SIGNIFICANT CHANGE UP (ref 0–0.5)
EOSINOPHIL NFR BLD AUTO: 0.9 % — SIGNIFICANT CHANGE UP (ref 0–6)
EPI CELLS # UR: 5 /HPF — SIGNIFICANT CHANGE UP
GAS PNL BLDV: 136 MMOL/L — SIGNIFICANT CHANGE UP (ref 136–145)
GAS PNL BLDV: SIGNIFICANT CHANGE UP
GLUCOSE BLDV-MCNC: 96 MG/DL — SIGNIFICANT CHANGE UP (ref 70–99)
GLUCOSE SERPL-MCNC: 96 MG/DL — SIGNIFICANT CHANGE UP (ref 70–99)
GLUCOSE UR QL: NEGATIVE — SIGNIFICANT CHANGE UP
HCG SERPL-ACNC: <2 MIU/ML — SIGNIFICANT CHANGE UP
HCO3 BLDV-SCNC: 26 MMOL/L — SIGNIFICANT CHANGE UP (ref 22–29)
HCT VFR BLD CALC: 34.8 % — SIGNIFICANT CHANGE UP (ref 34.5–45)
HCT VFR BLDA CALC: 33 % — LOW (ref 34.5–46.5)
HGB BLD CALC-MCNC: 11.1 G/DL — LOW (ref 11.7–16.1)
HGB BLD-MCNC: 10.7 G/DL — LOW (ref 11.5–15.5)
HYALINE CASTS # UR AUTO: 6 /LPF — HIGH (ref 0–2)
INR BLD: 1.13 RATIO — SIGNIFICANT CHANGE UP (ref 0.88–1.16)
KETONES UR-MCNC: NEGATIVE — SIGNIFICANT CHANGE UP
LACTATE BLDV-MCNC: 2.3 MMOL/L — HIGH (ref 0.5–2)
LEUKOCYTE ESTERASE UR-ACNC: NEGATIVE — SIGNIFICANT CHANGE UP
LIDOCAIN IGE QN: 14 U/L — SIGNIFICANT CHANGE UP (ref 7–60)
LYMPHOCYTES # BLD AUTO: 3.09 K/UL — SIGNIFICANT CHANGE UP (ref 1–3.3)
LYMPHOCYTES # BLD AUTO: 30.4 % — SIGNIFICANT CHANGE UP (ref 13–44)
MANUAL SMEAR VERIFICATION: SIGNIFICANT CHANGE UP
MCHC RBC-ENTMCNC: 23.8 PG — LOW (ref 27–34)
MCHC RBC-ENTMCNC: 30.7 GM/DL — LOW (ref 32–36)
MCV RBC AUTO: 77.3 FL — LOW (ref 80–100)
MICROCYTES BLD QL: SLIGHT — SIGNIFICANT CHANGE UP
MONOCYTES # BLD AUTO: 0.71 K/UL — SIGNIFICANT CHANGE UP (ref 0–0.9)
MONOCYTES NFR BLD AUTO: 7 % — SIGNIFICANT CHANGE UP (ref 2–14)
NEUTROPHILS # BLD AUTO: 6.26 K/UL — SIGNIFICANT CHANGE UP (ref 1.8–7.4)
NEUTROPHILS NFR BLD AUTO: 61.7 % — SIGNIFICANT CHANGE UP (ref 43–77)
NITRITE UR-MCNC: NEGATIVE — SIGNIFICANT CHANGE UP
PCO2 BLDV: 49 MMHG — HIGH (ref 39–42)
PH BLDV: 7.33 — SIGNIFICANT CHANGE UP (ref 7.32–7.43)
PH UR: 5.5 — SIGNIFICANT CHANGE UP (ref 5–8)
PLAT MORPH BLD: NORMAL — SIGNIFICANT CHANGE UP
PLATELET # BLD AUTO: 300 K/UL — SIGNIFICANT CHANGE UP (ref 150–400)
PO2 BLDV: 24 MMHG — LOW (ref 25–45)
POIKILOCYTOSIS BLD QL AUTO: SLIGHT — SIGNIFICANT CHANGE UP
POLYCHROMASIA BLD QL SMEAR: SLIGHT — SIGNIFICANT CHANGE UP
POTASSIUM BLDV-SCNC: 6.3 MMOL/L — CRITICAL HIGH (ref 3.5–5.1)
POTASSIUM SERPL-MCNC: 4.4 MMOL/L — SIGNIFICANT CHANGE UP (ref 3.5–5.3)
POTASSIUM SERPL-SCNC: 4.4 MMOL/L — SIGNIFICANT CHANGE UP (ref 3.5–5.3)
PROT SERPL-MCNC: 8.2 G/DL — SIGNIFICANT CHANGE UP (ref 6–8.3)
PROT UR-MCNC: ABNORMAL
PROTHROM AB SERPL-ACNC: 13 SEC — SIGNIFICANT CHANGE UP (ref 10.5–13.4)
RBC # BLD: 4.5 M/UL — SIGNIFICANT CHANGE UP (ref 3.8–5.2)
RBC # FLD: 20.5 % — HIGH (ref 10.3–14.5)
RBC BLD AUTO: ABNORMAL
RBC CASTS # UR COMP ASSIST: 7 /HPF — HIGH (ref 0–4)
RH IG SCN BLD-IMP: POSITIVE — SIGNIFICANT CHANGE UP
SAO2 % BLDV: 29.6 % — LOW (ref 67–88)
SODIUM SERPL-SCNC: 141 MMOL/L — SIGNIFICANT CHANGE UP (ref 135–145)
SP GR SPEC: >1.05 (ref 1.01–1.02)
TARGETS BLD QL SMEAR: SLIGHT — SIGNIFICANT CHANGE UP
UROBILINOGEN FLD QL: NEGATIVE — SIGNIFICANT CHANGE UP
WBC # BLD: 10.15 K/UL — SIGNIFICANT CHANGE UP (ref 3.8–10.5)
WBC # FLD AUTO: 10.15 K/UL — SIGNIFICANT CHANGE UP (ref 3.8–10.5)
WBC UR QL: 6 /HPF — HIGH (ref 0–5)

## 2023-05-02 PROCEDURE — 82330 ASSAY OF CALCIUM: CPT

## 2023-05-02 PROCEDURE — 85610 PROTHROMBIN TIME: CPT

## 2023-05-02 PROCEDURE — 96375 TX/PRO/DX INJ NEW DRUG ADDON: CPT

## 2023-05-02 PROCEDURE — 85018 HEMOGLOBIN: CPT

## 2023-05-02 PROCEDURE — 80053 COMPREHEN METABOLIC PANEL: CPT

## 2023-05-02 PROCEDURE — 99285 EMERGENCY DEPT VISIT HI MDM: CPT | Mod: 25

## 2023-05-02 PROCEDURE — 85025 COMPLETE CBC W/AUTO DIFF WBC: CPT

## 2023-05-02 PROCEDURE — 82803 BLOOD GASES ANY COMBINATION: CPT

## 2023-05-02 PROCEDURE — 84295 ASSAY OF SERUM SODIUM: CPT

## 2023-05-02 PROCEDURE — 71045 X-RAY EXAM CHEST 1 VIEW: CPT | Mod: 26

## 2023-05-02 PROCEDURE — 82435 ASSAY OF BLOOD CHLORIDE: CPT

## 2023-05-02 PROCEDURE — 93005 ELECTROCARDIOGRAM TRACING: CPT

## 2023-05-02 PROCEDURE — 84702 CHORIONIC GONADOTROPIN TEST: CPT

## 2023-05-02 PROCEDURE — 85014 HEMATOCRIT: CPT

## 2023-05-02 PROCEDURE — 83690 ASSAY OF LIPASE: CPT

## 2023-05-02 PROCEDURE — 96374 THER/PROPH/DIAG INJ IV PUSH: CPT | Mod: XU

## 2023-05-02 PROCEDURE — 82947 ASSAY GLUCOSE BLOOD QUANT: CPT

## 2023-05-02 PROCEDURE — 83605 ASSAY OF LACTIC ACID: CPT

## 2023-05-02 PROCEDURE — 74177 CT ABD & PELVIS W/CONTRAST: CPT | Mod: 26,MA

## 2023-05-02 PROCEDURE — 71045 X-RAY EXAM CHEST 1 VIEW: CPT

## 2023-05-02 PROCEDURE — 84132 ASSAY OF SERUM POTASSIUM: CPT

## 2023-05-02 PROCEDURE — 81001 URINALYSIS AUTO W/SCOPE: CPT

## 2023-05-02 PROCEDURE — 99285 EMERGENCY DEPT VISIT HI MDM: CPT

## 2023-05-02 PROCEDURE — 85730 THROMBOPLASTIN TIME PARTIAL: CPT

## 2023-05-02 PROCEDURE — 86850 RBC ANTIBODY SCREEN: CPT

## 2023-05-02 PROCEDURE — 36415 COLL VENOUS BLD VENIPUNCTURE: CPT

## 2023-05-02 PROCEDURE — 86901 BLOOD TYPING SEROLOGIC RH(D): CPT

## 2023-05-02 PROCEDURE — 86900 BLOOD TYPING SEROLOGIC ABO: CPT

## 2023-05-02 PROCEDURE — 74177 CT ABD & PELVIS W/CONTRAST: CPT | Mod: MA

## 2023-05-02 RX ORDER — THIAMINE MONONITRATE (VIT B1) 100 MG
100 TABLET ORAL ONCE
Refills: 0 | Status: COMPLETED | OUTPATIENT
Start: 2023-05-02 | End: 2023-05-02

## 2023-05-02 RX ORDER — FOLIC ACID 0.8 MG
1 TABLET ORAL ONCE
Refills: 0 | Status: COMPLETED | OUTPATIENT
Start: 2023-05-02 | End: 2023-05-02

## 2023-05-02 RX ADMIN — Medication 1 MILLIGRAM(S): at 20:09

## 2023-05-02 RX ADMIN — Medication 100 MILLIGRAM(S): at 20:01

## 2023-05-02 NOTE — ED ADULT NURSE NOTE - OBJECTIVE STATEMENT
49y/o Female PMH of asthma , bowel obstruction, gastric bypass and  x1 complaining of abdominal and  nausea for 3 days but has worsen since this morning. patient stated pain was 10/10 but now reports 6/10 pain mostly to the lower left abdomen . Last bowel movement was 2 days ago. patient denied any pain or nausea medication. 20g Iv placed at triage. Pending CT results.

## 2023-05-02 NOTE — ED PROVIDER NOTE - PATIENT PORTAL LINK FT
You can access the FollowMyHealth Patient Portal offered by [facility] by registering at the following website: [email or web address]. By joining [facility]’s FollowMyHealth portal, you will also be able to view your health information using other applications (apps) compatible with our system.

## 2023-05-02 NOTE — ED PROVIDER NOTE - PROGRESS NOTE DETAILS
Pt reevaluated by me after being seen by me in [facility].  Reports feeling better after CT, still not passing gas but reporting abd pain has improved.  Abd soft and NT.  Awaiting CT read and will reassess.  Aline Spoke with surgery pt is cleared for discharge, would like her to follow up with bypass surgery Dr White as well as see GI for upper endoscopy to eval for thickening on CT.  Pt understands and is comfortable with the plan.  Aline

## 2023-05-02 NOTE — ED PROVIDER NOTE - RAPID ASSESSMENT
48-year-old female history of SBO's requiring surgical intervention in the past coming in with abdominal pain over the past 72 hours.  Patient endorsing nausea no vomiting but not passing flatus from below for over 24 hours now.  Pain is consistent with prior SBO's.  Patient denies fevers chills.  Patient chest patient denies chest pain or shortness of breath.  Patient was rapidly assessed via a telemedicine and/or role of Quick Triage Doctor; a limited history, physical exam and assessment was performed. The patient will be seen and further evaluated in the main emergency department. The remainder of care and evaluation will be conducted by the primary emergency medicine team. Receiving team will follow up on labs, imaging and serially reassess patient as indicated. All further decisions regarding patient care, evaluation and disposition are at the discretion of the receiving primary emergency department team. Aline

## 2023-05-02 NOTE — ED PROVIDER NOTE - CARE PROVIDER_API CALL
Rinana White (MD)  Surgery  [address] [place] [address]  Phone: [phone]  Fax: [phone]  Follow Up Time:     Rabmo Conn (DO)  Gastroenterology; Internal Medicine  [address] [place] [address]  Phone: [phone]  Fax: [phone]  Follow Up Time:

## 2023-05-02 NOTE — ED PROVIDER NOTE - NSICDXPASTSURGICALHX_GEN_ALL_CORE_FT
no palpitations/no peripheral edema/no syncope
PAST SURGICAL HISTORY:  Breast hypertrophy s/p breast reduction    Obesity, morbid s/p Lap band  2009    S/P  section

## 2023-05-02 NOTE — ED PROVIDER NOTE - OBJECTIVE STATEMENT
Marv ANDRES: Patient is a 48-year-old female with history of GERD, history of gastric lap band and gastric bypass surgery, history of SBO here for 3 days of abdominal pain, acutely worse today.  Patient reports that she is not passed any gas for about 2 days.  Last bowel movement was 2 days ago.  She complains of nausea.  Denies any vomiting.  No fevers, chills.  No chest pain or shortness of breath.  Patient denies any urinary symptoms.  She states her gastric bypass surgery was at [facility] in 2019.

## 2023-05-02 NOTE — CONSULT NOTE ADULT - ASSESSMENT
ASSESSMENT: Patient is a 48-year-old female with history of GERD, history of gastric lap band and gastric bypass surgery in 2018 and dx lap and repair of internal hernia in 2019, p/w abdominal pain, acutely worse today.  patient is clinically not obstructed and imaging without any acute findings.     PLAN:   - no acute surgical intervention indicated   - imaging reviewed without obstruction but thickening at the level of G-J anastomosis   - recommend outpatient follow up with Dr. White and GI for upper endoscopy  - po challenge   - dispo per ED     - Plan discussed with Fellow on behalf of Attending, Dr. Eduardo Medina Surgery  [id number]

## 2023-05-02 NOTE — ED PROVIDER NOTE - NSFOLLOWUPINSTRUCTIONS_ED_ALL_ED_FT
1- Advance diet slowly  2- Tylenol as needed for pain do not exceeded 4g a day total  3- You had some non specific thickening on your Cat scan of your stomach, this is best evaluated by your GI specialist, you can do this with Rambo Conn, your GI doctor, or our clinic  4- Follow up with bypass surgeon Dr White as an out patient  5- Return to ER for any new or worsening complaints

## 2023-05-02 NOTE — ED PROVIDER NOTE - NSFOLLOWUPCLINICS_GEN_ALL_ED_FT
[facility]  Gastroenterology  [address] [place] [address] [address]  [place] [address]  Phone: [phone]  Fax:

## 2023-05-02 NOTE — ED PROVIDER NOTE - CLINICAL SUMMARY MEDICAL DECISION MAKING FREE TEXT BOX
Marv ANDRES: 49 yo F with hx of gastric bypass surgery, hx of SBO here for 3 days of abdominal pain, nausea. Exam significant for mild diffuse tenderness. Last passed flatus 2 days ago. Concern for SBO. Patient had labs, CT A/P ordered. She declined pain medication.

## 2023-05-02 NOTE — CONSULT NOTE ADULT - SUBJECTIVE AND OBJECTIVE BOX
BARIATRIC SURGERY CONSULT NOTE  --------------------------------------------------------------------------------------------    HPI:   Patient is a 48-year-old female with history of GERD, history of gastric lap band and gastric bypass surgery, history of SBO here for 3 days of abdominal pain, acutely worse today.  Patient reports that she is not passed any gas for about 2 days.  Last bowel movement was 2 days ago.  She complains of nausea.  Denies any vomiting.  No fevers, chills.  No chest pain or shortness of breath.  Patient denies any urinary symptoms.  She states her gastric bypass surgery was at [facility] in 2019.    Bariatric Surgery called in to evaluate the patient.     ROS: 10-system review is otherwise negative except HPI above.      PAST MEDICAL & SURGICAL HISTORY:  GERD (gastroesophageal reflux disease)      Sleep apnea, obstructive  CPAP  - not using      Morbid obesity      Hiatal hernia      Mild intermittent asthma without complication  last rescue inhaler use 4 months ago      Obesity, morbid  s/p Lap band        Breast hypertrophy  s/p breast reduction      S/P  section        FAMILY HISTORY:  No pertinent family history in first degree relatives    [] Family history not pertinent as reviewed with the patient and family    SOCIAL HISTORY:  n/a    ALLERGIES: doxycycline (Hives; Anaphylaxis)  food allergy (Unknown)      HOME MEDICATIONS: n/a    CURRENT MEDICATIONS  MEDICATIONS (STANDING):   MEDICATIONS (PRN):  --------------------------------------------------------------------------------------------    Vitals:   T(C): 36.8 (23 @ 20:10), Max: 37.1 (23 @ 14:16)  HR: 88 (23 @ 20:10) (80 - 97)  BP: 132/88 (23 @ 20:10) (132/88 - 138/82)  RR: 18 (23 @ 20:10) (18 - 20)  SpO2: 99% (23 @ 20:10) (96% - 99%)  CAPILLARY BLOOD GLUCOSE          Height (cm): 152.4 ( @ 14:16)  Weight (kg): 84.4 ( 14:16)  BMI (kg/m2): 36.3 ( 14:16)  BSA (m2): 1.81 ( 14:16)    PHYSICAL EXAM:  General: NAD, Lying in bed comfortably  Neuro: A+Ox3  Neck: Soft, supple  Cardio: RRR, nml S1/S2  Resp: Good effort, CTA b/l  GI/Abd: Soft, NT/ND, no rebound/guarding, prior incisions well healed   Vascular: All 4 extremities warm.   --------------------------------------------------------------------------------------------    LABS  CBC ( 17:54)                              10.7<L>                         10.15   )----------------(  300        61.7  % Neutrophils, 30.4  % Lymphocytes, ANC: 6.26                                34.8      BMP ( 17:54)             141     |  104     |  15    		Ca++ --      Ca 9.9                ---------------------------------( 96    		Mg --                 4.4     |  22      |  0.75  			Ph --        LFTs ( 17:54)      TPro 8.2 / Alb 4.5 / TBili 0.5 / DBili -- / AST 33 / ALT 27 / AlkPhos 78    Coags ( 17:55)  aPTT 27.2<L> / INR 1.13 / PT 13.0        VBG ( @ 15:10)     7.33 / 49<H> / 24<L> / 26 / -0.5 / 29.6<L>%     Lactate: 2.3<H>    --------------------------------------------------------------------------------------------    MICROBIOLOGY      --------------------------------------------------------------------------------------------    IMAGING  < from: CT Abdomen and Pelvis w/ Oral Cont and w/ IV Cont (23 @ 18:40) >  FINDINGS:  LOWER CHEST: Left lower lobe thin-walled parenchymal cysts.    LIVER: Within normal limits.  BILE DUCTS: Normal caliber.  GALLBLADDER: Within normal limits.  SPLEEN: Within normal limits.  PANCREAS: Within normal limits.  ADRENALS: Within normal limits.  KIDNEYS/URETERS: Within normal limits.    BLADDER: Within normal limits.  REPRODUCTIVE ORGANS: Uterus and adnexa within normal limits.    BOWEL: Gastric bypass. Small bowel anastomoses. No bowel obstruction.   Liquid stool throughout the colon.  PERITONEUM: No ascites.  VESSELS: Within normal limits.  RETROPERITONEUM/LYMPH NODES: No lymphadenopathy.  ABDOMINAL WALL: Within normal limits.  BONES: Degenerative changes.    IMPRESSION:  No bowel obstruction.        --- End of Report ---          CHELLE DYSON MD; Resident Interventional Radiology  This document has been electronically signed.  SANDY MENDIETA MD; Attending Radiologist  This document has beenelectronically signed. May  2 2023  7:02PM    < end of copied text >      --------------------------------------------------------------------------------------------

## 2023-05-02 NOTE — ED PROVIDER NOTE - NS ED ATTENDING STATEMENT MOD
This was a shared visit with the TOMMY. I reviewed and verified the documentation and independently performed the documented:

## 2023-07-25 ENCOUNTER — APPOINTMENT (OUTPATIENT)
Dept: PULMONOLOGY | Facility: CLINIC | Age: 48
End: 2023-07-25
Payer: COMMERCIAL

## 2023-07-25 VITALS
DIASTOLIC BLOOD PRESSURE: 68 MMHG | SYSTOLIC BLOOD PRESSURE: 105 MMHG | BODY MASS INDEX: 37.57 KG/M2 | RESPIRATION RATE: 16 BRPM | HEART RATE: 73 BPM | OXYGEN SATURATION: 98 % | WEIGHT: 186 LBS

## 2023-07-25 DIAGNOSIS — J45.909 UNSPECIFIED ASTHMA, UNCOMPLICATED: ICD-10-CM

## 2023-07-25 DIAGNOSIS — R06.00 DYSPNEA, UNSPECIFIED: ICD-10-CM

## 2023-07-25 DIAGNOSIS — J98.4 OTHER DISORDERS OF LUNG: ICD-10-CM

## 2023-07-25 PROCEDURE — 95012 NITRIC OXIDE EXP GAS DETER: CPT

## 2023-07-25 PROCEDURE — 99214 OFFICE O/P EST MOD 30 MIN: CPT | Mod: 25

## 2023-07-25 PROCEDURE — 94010 BREATHING CAPACITY TEST: CPT

## 2023-07-25 RX ORDER — LACTULOSE 10 G/10G
10 SOLUTION ORAL
Refills: 0 | Status: COMPLETED | COMMUNITY
End: 2023-07-25

## 2023-07-25 RX ORDER — OMEPRAZOLE 40 MG/1
CAPSULE, DELAYED RELEASE ORAL
Refills: 0 | Status: COMPLETED | COMMUNITY
End: 2023-07-25

## 2023-07-26 PROBLEM — R06.00 DYSPNEA: Status: ACTIVE | Noted: 2022-11-24

## 2023-07-26 NOTE — HISTORY OF PRESENT ILLNESS
[TextBox_4] : JAILYN SOMMER is a 48 year old female, with history of asthma, lung cyst, who presents to the office for follow up evaluation. \par \par Overall feeling well; no respiratory complaints reported at this time \par \par Continues to use Symbicort; albuterol PRN (last time was last year)

## 2023-07-26 NOTE — DISCUSSION/SUMMARY
[FreeTextEntry1] : Ms. JAILYN SOMMER is an 48 year old female with asthma, history of lung cyst. Patient appears stable from a pulmonary perspective.

## 2023-07-26 NOTE — PROCEDURE
[FreeTextEntry1] : Spirometry is within normal limits\par ___\par \par  Exhaled Nitric Oxide             Final\par \par No Documents Attached\par \par \par   Test   Result   Flag Reference Goal Last Verified \par   Exhaled Nitric Oxide 16      REQUIRED \par \par  Ordered by: TAYLER ANGEL       Collected/Examined: [id number] 10:53AM       \par Verification Required       Stage: Final       \par  Performed at: In Office       Performed by: TAYLER ANGEL       Resulted: [id number] 10:53AM       Last Updated: [id number] 10:53AM       \par

## 2023-07-26 NOTE — ASSESSMENT
[FreeTextEntry1] : Obtained and reviewed spirometry, NIOX results with patient today. \par Continue Symbicort for history of asthma.\par Albuterol HFA as needed for shortness of breath\par Get noncontrast chest CT scan for follow-up on history of enlarging lung cysts in 1 month.\par Return for pulmonary follow after receiving chest CT scan.

## 2023-08-22 ENCOUNTER — APPOINTMENT (OUTPATIENT)
Dept: PULMONOLOGY | Facility: CLINIC | Age: 48
End: 2023-08-22

## 2023-11-03 ENCOUNTER — RX RENEWAL (OUTPATIENT)
Age: 48
End: 2023-11-03

## 2023-11-03 RX ORDER — BUDESONIDE AND FORMOTEROL FUMARATE DIHYDRATE 160; 4.5 UG/1; UG/1
160-4.5 AEROSOL RESPIRATORY (INHALATION) TWICE DAILY
Qty: 3 | Refills: 3 | Status: ACTIVE | COMMUNITY
Start: 2022-11-23 | End: 1900-01-01

## 2024-09-19 ENCOUNTER — APPOINTMENT (OUTPATIENT)
Facility: CLINIC | Age: 49
End: 2024-09-19
Payer: COMMERCIAL

## 2024-09-19 VITALS
OXYGEN SATURATION: 96 % | DIASTOLIC BLOOD PRESSURE: 103 MMHG | BODY MASS INDEX: 37.5 KG/M2 | WEIGHT: 186 LBS | SYSTOLIC BLOOD PRESSURE: 152 MMHG | HEART RATE: 95 BPM | HEIGHT: 59 IN | TEMPERATURE: 98 F

## 2024-09-19 DIAGNOSIS — Z80.49 FAMILY HISTORY OF MALIGNANT NEOPLASM OF OTHER GENITAL ORGANS: ICD-10-CM

## 2024-09-19 DIAGNOSIS — Z87.09 PERSONAL HISTORY OF OTHER DISEASES OF THE RESPIRATORY SYSTEM: ICD-10-CM

## 2024-09-19 DIAGNOSIS — R87.612 LOW GRADE SQUAMOUS INTRAEPITHELIAL LESION ON CYTOLOGIC SMEAR OF CERVIX (LGSIL): ICD-10-CM

## 2024-09-19 PROCEDURE — 99203 OFFICE O/P NEW LOW 30 MIN: CPT | Mod: 25

## 2024-09-19 PROCEDURE — 57454 BX/CURETT OF CERVIX W/SCOPE: CPT

## 2024-09-19 RX ORDER — LISDEXAMFETAMINE DIMESYLATE 60 MG/1
60 CAPSULE ORAL
Refills: 0 | Status: ACTIVE | COMMUNITY

## 2024-09-19 RX ORDER — OMEPRAZOLE, SODIUM BICARBONATE 40; 1100 MG/1; MG/1
CAPSULE ORAL
Refills: 0 | Status: ACTIVE | COMMUNITY

## 2024-09-19 RX ORDER — TOPIRAMATE 50 MG/1
TABLET, COATED ORAL
Refills: 0 | Status: ACTIVE | COMMUNITY

## 2024-09-19 NOTE — ASSESSMENT
[FreeTextEntry1] : 49 year old woman with extreme retropubic displacement of a normal appearing cervix. Prior LGSIL Pap in Feb 2024 with normal colpo today. Will follow up repeat Pap and ECC as well as HR HPV testing and genotyping. Would consider EUA and colpo/LEEP for HGSIL or persistently positive HR HPV. Pt understands and agrees with this plan

## 2024-09-19 NOTE — HISTORY OF PRESENT ILLNESS
[FreeTextEntry1] : This is an initial consultation for this 49 year-old woman with a history of a LGSIL Pap smear in 2024. She was evaluated in the office however her cervix was difficult to evaluate due to its position. She had a repeat visit with Dr. Frank Almodovar at [facility] who was also unable to see the cervix and recommended an EUA. No cytology or HPV testing was performed and pt presents today for a second opinion. She has a hx of gastric bypass in .  A CT scan of the abdomen and pelvis on 2024 shows normal uterus and adnexa and no lymphadenopathy. The gastric bypass anastomosis was unremarkable, with a small amount of fluid within the excluded stomach and proximal duodenum with no evidence of obstruction. The possibility of a gastro-gastric fistula was raised. She has no hx of abnl Paps and has required EUA in the past to perform the test. She remains asymptomatic and denies abnl dc bleeding. Her LMP was 2 years ago. SHe has no significant gynecologic hx and has been pregnant 4 times with 2 children (1 , 1C/S).

## 2024-09-19 NOTE — PAST MEDICAL HISTORY
[Approximately ___] : the LMP was approximately [unfilled] [Total Preg ___] : G[unfilled] [Live Births ___] : P[unfilled]  [Living ___] : Living: [unfilled]

## 2024-09-19 NOTE — PROCEDURE
[Colposcopy] : colposcopy [Abnormal Pap] : an abnormal pap smear [LGSIL] : low grade squamous intraepithelial lesion [Patient] : the patient [Written Consent] : written consent was obtained prior to the procedure and is detailed in the patient's record [Cervical Pap Performed] : a cervical pap smear was performed [No Abnormalities] : no abnormalities seen [ECC Done] : an Endocervical curettage was performed.  [No Complications] : none [Tolerated Well] : the patient tolerated the procedure well [SCJ Fully Visualized] : the squamocolumnar junction was not fully visualized [Biopsies Taken: # ___] : no biopsies were taken of the cervix [FreeTextEntry1] : Normal sized cervix with extreme retropubic position. Cervix grasped with tenaculum to assist with visualization

## 2024-09-21 LAB
HPV 16 E6+E7 MRNA CVX QL NAA+PROBE: NOT DETECTED
HPV18+45 E6+E7 MRNA CVX QL NAA+PROBE: NOT DETECTED

## 2024-09-25 LAB
CORE LAB BIOPSY: NORMAL
CYTOLOGY CVX/VAG DOC THIN PREP: NORMAL